# Patient Record
Sex: FEMALE | Race: WHITE | NOT HISPANIC OR LATINO | Employment: FULL TIME | URBAN - METROPOLITAN AREA
[De-identification: names, ages, dates, MRNs, and addresses within clinical notes are randomized per-mention and may not be internally consistent; named-entity substitution may affect disease eponyms.]

---

## 2018-08-04 ENCOUNTER — WALK IN (OUTPATIENT)
Dept: URGENT CARE | Age: 43
End: 2018-08-04

## 2018-08-04 VITALS
BODY MASS INDEX: 22.66 KG/M2 | WEIGHT: 123.13 LBS | SYSTOLIC BLOOD PRESSURE: 118 MMHG | RESPIRATION RATE: 16 BRPM | HEIGHT: 62 IN | TEMPERATURE: 97.9 F | HEART RATE: 80 BPM | OXYGEN SATURATION: 95 % | DIASTOLIC BLOOD PRESSURE: 83 MMHG

## 2018-08-04 DIAGNOSIS — R21 RASH OF HANDS: Primary | ICD-10-CM

## 2018-08-04 PROCEDURE — 99203 OFFICE O/P NEW LOW 30 MIN: CPT | Performed by: PHYSICIAN ASSISTANT

## 2018-08-04 RX ORDER — ATENOLOL 25 MG/1
25 TABLET ORAL DAILY
COMMUNITY

## 2018-08-04 RX ORDER — VENLAFAXINE HYDROCHLORIDE 150 MG/1
150 CAPSULE, EXTENDED RELEASE ORAL DAILY
COMMUNITY

## 2018-08-04 RX ORDER — BUSPIRONE HYDROCHLORIDE 15 MG/1
15 TABLET ORAL 2 TIMES DAILY
COMMUNITY

## 2018-08-04 RX ORDER — TRIAMCINOLONE ACETONIDE 1 MG/G
CREAM TOPICAL 3 TIMES DAILY
Qty: 30 G | Refills: 0 | Status: SHIPPED | OUTPATIENT
Start: 2018-08-04

## 2018-08-04 RX ORDER — SUMATRIPTAN 100 MG/1
100 TABLET, FILM COATED ORAL PRN
COMMUNITY

## 2018-08-04 ASSESSMENT — ENCOUNTER SYMPTOMS
CHILLS: 0
FEVER: 0
WEAKNESS: 0
NUMBNESS: 0

## 2019-02-06 ENCOUNTER — HOSPITAL ENCOUNTER (OUTPATIENT)
Dept: MAMMOGRAPHY | Age: 44
Discharge: HOME | End: 2019-02-06
Payer: COMMERCIAL

## 2019-02-06 DIAGNOSIS — Z12.39 BREAST CANCER SCREENING: ICD-10-CM

## 2019-02-06 PROCEDURE — 77067 SCR MAMMO BI INCL CAD: CPT

## 2020-02-21 ENCOUNTER — HOSPITAL ENCOUNTER (OUTPATIENT)
Dept: MAMMOGRAPHY | Age: 45
Discharge: HOME | End: 2020-02-21
Payer: COMMERCIAL

## 2020-02-21 PROCEDURE — 77063 BREAST TOMOSYNTHESIS BI: CPT

## 2020-09-12 ENCOUNTER — HOSPITAL ENCOUNTER (EMERGENCY)
Age: 45
Discharge: HOME | End: 2020-09-12
Payer: COMMERCIAL

## 2020-09-12 ENCOUNTER — HOSPITAL ENCOUNTER (EMERGENCY)
Age: 45
Discharge: HOME | End: 2020-09-12
Attending: EMERGENCY MEDICINE
Payer: COMMERCIAL

## 2020-09-12 VITALS
TEMPERATURE: 98.7 F | RESPIRATION RATE: 16 BRPM | HEART RATE: 65 BPM | BODY MASS INDEX: 22.63 KG/M2 | DIASTOLIC BLOOD PRESSURE: 86 MMHG | HEIGHT: 62 IN | OXYGEN SATURATION: 100 % | SYSTOLIC BLOOD PRESSURE: 130 MMHG | WEIGHT: 123 LBS

## 2020-09-12 VITALS
DIASTOLIC BLOOD PRESSURE: 81 MMHG | HEIGHT: 62 IN | HEART RATE: 72 BPM | OXYGEN SATURATION: 98 % | WEIGHT: 120 LBS | BODY MASS INDEX: 22.08 KG/M2 | TEMPERATURE: 98 F | RESPIRATION RATE: 20 BRPM | SYSTOLIC BLOOD PRESSURE: 139 MMHG

## 2020-09-12 PROCEDURE — 96375 TX/PRO/DX INJ NEW DRUG ADDON: CPT

## 2020-09-12 PROCEDURE — 99212 OFFICE O/P EST SF 10 MIN: CPT

## 2020-09-12 PROCEDURE — 2500000003 HC RX 250 WO HCPCS: Performed by: PHYSICIAN ASSISTANT

## 2020-09-12 PROCEDURE — 96372 THER/PROPH/DIAG INJ SC/IM: CPT

## 2020-09-12 PROCEDURE — 6360000002 HC RX W HCPCS: Performed by: EMERGENCY MEDICINE

## 2020-09-12 PROCEDURE — 6360000002 HC RX W HCPCS: Performed by: PHYSICIAN ASSISTANT

## 2020-09-12 PROCEDURE — 99282 EMERGENCY DEPT VISIT SF MDM: CPT

## 2020-09-12 PROCEDURE — 99203 OFFICE O/P NEW LOW 30 MIN: CPT | Performed by: EMERGENCY MEDICINE

## 2020-09-12 PROCEDURE — 96374 THER/PROPH/DIAG INJ IV PUSH: CPT

## 2020-09-12 PROCEDURE — 2580000003 HC RX 258: Performed by: PHYSICIAN ASSISTANT

## 2020-09-12 RX ORDER — FAMOTIDINE 20 MG/1
20 TABLET, FILM COATED ORAL 2 TIMES DAILY
Qty: 14 TABLET | Refills: 0 | Status: SHIPPED | OUTPATIENT
Start: 2020-09-12

## 2020-09-12 RX ORDER — METHYLPREDNISOLONE ACETATE 80 MG/ML
80 INJECTION, SUSPENSION INTRA-ARTICULAR; INTRALESIONAL; INTRAMUSCULAR; SOFT TISSUE ONCE
Status: COMPLETED | OUTPATIENT
Start: 2020-09-12 | End: 2020-09-12

## 2020-09-12 RX ORDER — PREDNISONE 20 MG/1
20 TABLET ORAL DAILY
Qty: 14 TABLET | Refills: 0 | Status: SHIPPED | OUTPATIENT
Start: 2020-09-12 | End: 2020-09-24

## 2020-09-12 RX ORDER — 0.9 % SODIUM CHLORIDE 0.9 %
1000 INTRAVENOUS SOLUTION INTRAVENOUS ONCE
Status: COMPLETED | OUTPATIENT
Start: 2020-09-12 | End: 2020-09-12

## 2020-09-12 RX ORDER — HYDROXYZINE 50 MG/1
50 TABLET, FILM COATED ORAL 4 TIMES DAILY PRN
Qty: 20 TABLET | Refills: 0 | Status: SHIPPED | OUTPATIENT
Start: 2020-09-12

## 2020-09-12 RX ORDER — DIPHENHYDRAMINE HYDROCHLORIDE 50 MG/ML
50 INJECTION INTRAMUSCULAR; INTRAVENOUS ONCE
Status: COMPLETED | OUTPATIENT
Start: 2020-09-12 | End: 2020-09-12

## 2020-09-12 RX ADMIN — SODIUM CHLORIDE 1000 ML: 9 INJECTION, SOLUTION INTRAVENOUS at 20:20

## 2020-09-12 RX ADMIN — METHYLPREDNISOLONE ACETATE 80 MG: 80 INJECTION, SUSPENSION INTRA-ARTICULAR; INTRALESIONAL; INTRAMUSCULAR; SOFT TISSUE at 18:19

## 2020-09-12 RX ADMIN — HYDROCORTISONE SODIUM SUCCINATE 100 MG: 100 INJECTION, POWDER, FOR SOLUTION INTRAMUSCULAR; INTRAVENOUS at 20:20

## 2020-09-12 RX ADMIN — DIPHENHYDRAMINE HYDROCHLORIDE 50 MG: 50 INJECTION, SOLUTION INTRAMUSCULAR; INTRAVENOUS at 20:20

## 2020-09-12 RX ADMIN — FAMOTIDINE 20 MG: 10 INJECTION INTRAVENOUS at 20:20

## 2020-09-12 ASSESSMENT — ENCOUNTER SYMPTOMS
FACIAL SWELLING: 1
BACK PAIN: 0
STRIDOR: 0
VOICE CHANGE: 0
CHOKING: 0
SINUS PRESSURE: 0
EYE REDNESS: 0
TROUBLE SWALLOWING: 0
DIARRHEA: 0
NAUSEA: 0
BLOOD IN STOOL: 0
VOMITING: 0
RHINORRHEA: 0
PHOTOPHOBIA: 0
EYE PAIN: 0
SHORTNESS OF BREATH: 0
ROS SKIN COMMENTS: ITCHY RASH TRUNK AND EXTREMITIES
FACIAL SWELLING: 0
NAUSEA: 0
CONSTIPATION: 0
COUGH: 0
WHEEZING: 0
SORE THROAT: 0
EYE DISCHARGE: 0
TROUBLE SWALLOWING: 0
SHORTNESS OF BREATH: 0
DIARRHEA: 0
COUGH: 0
ABDOMINAL PAIN: 0

## 2020-09-12 ASSESSMENT — PAIN SCALES - GENERAL: PAINLEVEL_OUTOF10: 10

## 2020-09-12 NOTE — ED NOTES
Pt arrives with complaints of allergic reaction. Pt seen at urgent care. She was only given prednisone at Urgent care. pt reports itching has gotten worse.      John Garcia RN  09/12/20 5060

## 2020-09-12 NOTE — LETTER
[facility]  [address] [place] [id number]  Phone: [phone]               September 12, 2020    Patient: Alpesh Workman   YOB: 1975   Date of Visit: 9/12/2020       To Whom It May Concern:    Brandon Amin was seen and treated in our emergency department on 9/12/2020. She may return to work on 9/16/2020.   No work September 14 and September 15, 2020      Sincerely,       Renita Quach MD         Signature:__________________________________

## 2020-09-12 NOTE — ED NOTES
Patient presents to [facility] with complaints of rash all over that started x2 hours ago. Patient states she's not doing anything different besides today she was using cleaning products all day due to moving. Patient reports lower lip swelling and no angio edema or airway issues.       Heron Fish RN  09/12/20 8605

## 2020-09-12 NOTE — ED PROVIDER NOTES
[address]     No chief complaint on file. Nurses Notes reviewed and I agree except as noted in the HPI. HISTORY OF PRESENT ILLNESS   Mary Waters is a 50 y.o. female who presents with 2-hour history of increasingly severe itchy rash on the trunk and extremities. 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 No chest pain, shortness of breath, wheezing, stridor, abdominal pain, vomiting, dizziness, syncope. Status post BLTL. No new soaps, cosmetics, medications. No insect bite or sting. No history of diabetes or asthma. Non-smoker  REVIEW OF SYSTEMS     Review of Systems   Constitutional: Negative for appetite change, chills, fatigue, fever and unexpected weight change. HENT: Negative for congestion, ear discharge, ear pain, facial swelling, hearing loss, nosebleeds, postnasal drip, sinus pressure, sore throat, trouble swallowing and voice change. Eyes: Negative for pain, discharge, redness and visual disturbance. Respiratory: Negative for cough, choking, shortness of breath, wheezing and stridor. Cardiovascular: Negative for chest pain and leg swelling. Gastrointestinal: Negative for abdominal pain, blood in stool, constipation, diarrhea, nausea and vomiting. Genitourinary: Negative for dysuria, flank pain, frequency, hematuria, urgency, vaginal bleeding and vaginal discharge. Musculoskeletal: Negative for arthralgias, back pain, neck pain and neck stiffness. Skin: Positive for rash. Itchy rash trunk and extremities   Neurological: Negative for dizziness, seizures, syncope, weakness, light-headedness and headaches. Hematological: Negative for adenopathy. Does not bruise/bleed easily. Psychiatric/Behavioral: Negative for confusion, sleep disturbance and suicidal ideas. The patient is not nervous/anxious. All other systems reviewed and are negative.       PAST MEDICAL HISTORY Diagnosis Date    Headache        SURGICAL HISTORY     Patient  has a past surgical history that includes Hysterectomy; Stomach surgery; Tonsillectomy; Appendectomy; and Colonoscopy (2009). CURRENT MEDICATIONS       Discharge Medication List as of 9/12/2020  6:34 PM      CONTINUE these medications which have NOT CHANGED    Details   SUMAtriptan (IMITREX) 100 MG tablet Take 100 mg by mouth once as needed for Migraine      busPIRone (BUSPAR) 15 MG tablet Take 15 mg by mouth daily      venlafaxine (EFFEXOR XR) 150 MG XR capsule Take 150 mg by mouth daily. ferrous sulfate 300 (60 FE) MG/5ML syrup Take 300 mg by mouth daily. SUPREP BOWEL PREP SOLN Take 1 kit by mouth See Admin Instructions Use as directed, Disp-1 Bottle, R-0, CORNEL      Multiple Vitamins-Minerals (MULTI COMPLETE) CAPS Take  by mouth.      vitamin B-12 (CYANOCOBALAMIN) 100 MCG tablet Take 50 mcg by mouth daily. ALLERGIES     Patient is has No Known Allergies. FAMILY HISTORY     Patient'sfamily history includes Mental Illness in her mother. SOCIAL HISTORY     Patient  reports that she has never smoked. She has never used smokeless tobacco. She reports current alcohol use. She reports that she does not use drugs. PHYSICAL EXAM     ED TRIAGE VITALS  BP: 139/81, Temp: 98 °F (36.7 °C), Pulse: 72, Resp: 20, SpO2: 98 %  Physical Exam  Vitals signs and nursing note reviewed. Constitutional:       General: She is not in acute distress. Appearance: She is well-developed. She is not ill-appearing. Comments: Normal hearing, moist membranes, no stridor   HENT:      Head: Normocephalic and atraumatic. Right Ear: Tympanic membrane and external ear normal.      Left Ear: Tympanic membrane and external ear normal.      Nose: No congestion or rhinorrhea. Right Sinus: No maxillary sinus tenderness or frontal sinus tenderness. Left Sinus: No maxillary sinus tenderness or frontal sinus tenderness. Mouth/Throat:      Pharynx: No oropharyngeal exudate. Comments: Oropharynx normal no angioedema  Eyes:      General: No scleral icterus. Right eye: No discharge. Left eye: No discharge. Extraocular Movements:      Right eye: Normal extraocular motion. Left eye: Normal extraocular motion. Conjunctiva/sclera: Conjunctivae normal.      Pupils: Pupils are equal, round, and reactive to light. Comments: Conjunctiva clear   Neck:      Musculoskeletal: Normal range of motion. Thyroid: No thyromegaly. Vascular: No JVD. Comments: No meningismus  Cardiovascular:      Rate and Rhythm: Normal rate and regular rhythm. Pulses: Normal pulses. Heart sounds: Normal heart sounds, S1 normal and S2 normal. No murmur. No friction rub. No gallop. Pulmonary:      Effort: Pulmonary effort is normal. No respiratory distress. Breath sounds: Normal breath sounds. No stridor. No decreased breath sounds, wheezing, rhonchi or rales. Comments: No cough, lungs clear  Chest:      Chest wall: No tenderness. Abdominal:      General: Bowel sounds are normal. There is no distension. Palpations: Abdomen is soft. There is no mass. Tenderness: There is no abdominal tenderness. There is no right CVA tenderness, left CVA tenderness, guarding or rebound. Comments: Soft nontender   Musculoskeletal: Normal range of motion. General: No tenderness. Right lower leg: Normal.      Left lower leg: Normal.   Lymphadenopathy:      Cervical: Cervical adenopathy present. Right cervical: Superficial cervical adenopathy present. No deep cervical adenopathy. Left cervical: Superficial cervical adenopathy present. No deep cervical adenopathy. Skin:     General: Skin is warm and dry. Findings: No erythema or rash. Comments: Urticarial rash trunk and extremities. No vesicular eruptions. No angioedema. No infestation. No bacterial infection. Neurological:      Mental Status: She is alert and oriented to person, place, and time. Cranial Nerves: No cranial nerve deficit. Motor: No abnormal muscle tone. Coordination: Coordination normal.      Deep Tendon Reflexes: Reflexes are normal and symmetric. Reflexes normal.   Psychiatric:         Behavior: Behavior normal.         Thought Content: Thought content normal.         Judgment: Judgment normal.         DIAGNOSTIC RESULTS   Labs: No results found for this visit on 09/12/20. IMAGING:  No orders to display     URGENT CARE COURSE:     Vitals:    09/12/20 1813 09/12/20 1839   BP: 139/81    Pulse: 80 72   Resp: 20 20   Temp: 98 °F (36.7 °C)    TempSrc: Tympanic    SpO2: 99% 98%   Weight: 120 lb (54.4 kg)    Height: 5' 2\" (1.575 m)        Medications   methylPREDNISolone acetate (DEPO-MEDROL) injection 80 mg (80 mg Intramuscular Given 9/12/20 1819)   Tolerated well  PROCEDURES:  None  FINALIMPRESSION      1. Acute allergic reaction, initial encounter    2. Acute urticaria        DISPOSITION/PLAN   DISPOSITION    Nontoxic, well-hydrated, normal airway. No anaphylactic or anaphylactoid reaction, SJS, TEN. No infectious process. No airway compromise or bronchospasm. Patient has acute urticaria likely from cleaning agents. Patient received Depo-Medrol IM. Will require systemic corticosteroids and antihistamines. Will treat with prednisone, Atarax, rest in cool air conditioned space, increased oral clear liquids. Patient to recheck with PCP in 3 days if problems persist, and she understands to go to ED if worse.   PATIENT REFERRED TO:  Damari Liu, [address] [place]  [phone]    Schedule an appointment as soon as possible for a visit in 3 days  Recheck if problems persist, go to emergency if worse    DISCHARGE MEDICATIONS:  Discharge Medication List as of 9/12/2020  6:34 PM      START taking these medications    Details   predniSONE (DELTASONE) 20 MG tablet Take 1 tablet by mouth daily for 12 days 2 p.o. daily for 4 days, 1 p.o. daily for 4 days, one half p.o. daily for 4 days, Disp-14 tablet,R-0Print      hydrOXYzine (ATARAX) 50 MG tablet Take 1 tablet by mouth 4 times daily as needed for Itching (Itchy rash swelling) Caution will cause drowsiness, not at work, Disp-20 tablet,R-0Print           Discharge Medication List as of 9/12/2020  6:34 PM          MD Bebe Graves MD  09/12/20 9310

## 2020-09-12 NOTE — ED NOTES
Pt. Released in stable condition, ambulated per self to private car. Instructed pt to follow-up with family doctor as needed for recheck or go directly to the emergency department for any concerns/worsening conditions. Pt. Verbalized understanding of instructions. No questions at this time. RX in hand.       Kristen Batista RN  09/12/20 7410

## 2020-09-13 NOTE — ED PROVIDER NOTES
[facility] EMERGENCY DEPT      CHIEF COMPLAINT       Chief Complaint   Patient presents with    Allergic Reaction       Nurses Notes reviewed and I agree except as noted in the HPI. HISTORY OF PRESENT ILLNESS    Karen Patel is a 50 y.o. female who presents for an allergic reaction. The cause of the reaction is unknown. Patient states she was using a  that she has used in the past, and afterwards, she noticed itchiness under her breasts and on her right flank. She then took a shower, and the urticaria continued to spread to her back, arms, groin area, and her face. She denies any shortness of breath, throat tightness, tongue swelling, or airway obstruction. She denies any other allergies. Denies any new medications or detergent use. Patient was seen in Urgent  Care and given 80 mg IM methylprednisolone. The reaction has continued to worsen, so she came to the ED for further evaluation. REVIEW OF SYSTEMS     Review of Systems   Constitutional: Negative for chills and fever. HENT: Positive for facial swelling. Negative for ear pain, rhinorrhea and trouble swallowing. Eyes: Negative for photophobia. Respiratory: Negative for cough and shortness of breath. Cardiovascular: Negative for chest pain and palpitations. Gastrointestinal: Negative for diarrhea and nausea. Musculoskeletal: Negative for myalgias. Skin: Positive for rash. Allergic/Immunologic: Negative for environmental allergies and food allergies. Neurological: Negative for dizziness, light-headedness and numbness. Psychiatric/Behavioral: Negative for confusion. PAST MEDICAL HISTORY    has a past medical history of Headache. SURGICAL HISTORY      has a past surgical history that includes Hysterectomy; Stomach surgery; Tonsillectomy; Appendectomy; and Colonoscopy (2009).     CURRENT MEDICATIONS       Previous Medications    BUSPIRONE (BUSPAR) 15 MG TABLET    Take 15 mg by mouth daily    FERROUS SULFATE 300 (60 FE) MG/5ML SYRUP    Take 300 mg by mouth daily. HYDROXYZINE (ATARAX) 50 MG TABLET    Take 1 tablet by mouth 4 times daily as needed for Itching (Itchy rash swelling) Caution will cause drowsiness, not at work    MULTIPLE VITAMINS-MINERALS (MULTI COMPLETE) CAPS    Take  by mouth. PREDNISONE (DELTASONE) 20 MG TABLET    Take 1 tablet by mouth daily for 12 days 2 p.o. daily for 4 days, 1 p.o. daily for 4 days, one half p.o. daily for 4 days    SUMATRIPTAN (IMITREX) 100 MG TABLET    Take 100 mg by mouth once as needed for Migraine    SUPREP BOWEL PREP SOLN    Take 1 kit by mouth See Admin Instructions Use as directed    VENLAFAXINE (EFFEXOR XR) 150 MG XR CAPSULE    Take 150 mg by mouth daily. VITAMIN B-12 (CYANOCOBALAMIN) 100 MCG TABLET    Take 50 mcg by mouth daily. ALLERGIES     has No Known Allergies. FAMILY HISTORY     She indicated that her mother is alive. She indicated that her father is alive. family history includes Mental Illness in her mother. SOCIAL HISTORY    reports that she has never smoked. She has never used smokeless tobacco. She reports current alcohol use. She reports that she does not use drugs. PHYSICAL EXAM     INITIAL VITALS:  height is 5' 2\" (1.575 m) and weight is 123 lb (55.8 kg). Her temperature is 98.7 °F (37.1 °C). Her blood pressure is 130/86 and her pulse is 65. Her respiration is 16 and oxygen saturation is 100%. Physical Exam  Vitals signs and nursing note reviewed. Constitutional:       General: She is not in acute distress. Appearance: Normal appearance. She is well-developed and normal weight. She is not toxic-appearing or diaphoretic. HENT:      Head: Normocephalic and atraumatic. Jaw: No trismus. Right Ear: Tympanic membrane and ear canal normal. No drainage. Left Ear: Tympanic membrane and ear canal normal. No drainage. Nose: Nose normal. No rhinorrhea.       Mouth/Throat:      Mouth: Mucous membranes are moist.      Pharynx: Uvula midline. No oropharyngeal exudate or posterior oropharyngeal erythema. Tonsils: No tonsillar abscesses. Comments: No acute swelling of the tongue or oropharynx  Eyes:      General: Lids are normal.         Right eye: No discharge. Left eye: No discharge. Conjunctiva/sclera: Conjunctivae normal.   Neck:      Musculoskeletal: Normal range of motion. No neck rigidity. Trachea: Trachea normal.   Cardiovascular:      Rate and Rhythm: Normal rate and regular rhythm. Pulses: Normal pulses. Heart sounds: Normal heart sounds. Pulmonary:      Effort: Pulmonary effort is normal. No respiratory distress. Breath sounds: Normal breath sounds. No decreased breath sounds, wheezing, rhonchi or rales. Abdominal:      General: Abdomen is flat. There is no distension. Palpations: Abdomen is soft. Abdomen is not rigid. Tenderness: There is no abdominal tenderness. Musculoskeletal:      Comments: Well perfused; no signs of DVT; movement normal as observed. Lymphadenopathy:      Head:      Right side of head: No submental, submandibular, preauricular or posterior auricular adenopathy. Left side of head: No submental, submandibular, preauricular or posterior auricular adenopathy. Cervical: No cervical adenopathy. Skin:     General: Skin is warm and dry. Coloration: Skin is not pale. Findings: Rash present. Rash is urticarial.          Neurological:      Mental Status: She is alert and oriented to person, place, and time. GCS: GCS eye subscore is 4. GCS verbal subscore is 5. GCS motor subscore is 6. Psychiatric:         Speech: Speech normal.         Behavior: Behavior normal. Behavior is cooperative. Thought Content:  Thought content normal.         DIFFERENTIAL DIAGNOSIS:   Including but not limited to: acute allergic reaction, urticaria, angioedema    DIAGNOSTIC RESULTS     EKG: All EKG's are interpreted by theEmergency Department Physician who either signs or Co-signs this chart in the absence of a cardiologist.  None    RADIOLOGY: non-plain film images(s) such as CT,Ultrasound and MRI are read by the radiologist.  Plain radiographic images are visualized and preliminarily interpreted by the emergency physician unless otherwise stated below. No orders to display       LABS:   Labs Reviewed - No data to display    EMERGENCY DEPARTMENT COURSE:   Vitals:    Vitals:    09/12/20 1929   BP: 130/86   Pulse: 65   Resp: 16   Temp: 98.7 °F (37.1 °C)   SpO2: 100%   Weight: 123 lb (55.8 kg)   Height: 5' 2\" (1.575 m)     2110: Reassessed patient. Symptoms resolved    MDM:  Patient was seen by me initially in the intake area for urticaria. Physical exam revealed diffuse erythema and urticaria to the arms and trunk. Angioedema was noted to the lips but airway was patent. Vital signs reviewed and noted stable. Old records were reviewed. The patient was moved to room 30 where an IV was established. The patient was then medicated with Solu-Cortef, Benadryl, Pepcid, and normal saline. The patient was observed. She remained stable. On reexam symptoms were resolved. Airway remained patent. Patient was comfortable with plan of discharge home. Anticipatory guidance given. I have given the patient strict written and verbal instructions about care at home, follow-up, and signs and symptoms of worsening of condition and they did verbalize understanding. CRITICAL CARE:   None    CONSULTS:  None    PROCEDURES:  None    FINAL IMPRESSION      1. Urticaria          DISPOSITION/PLAN     1.  Urticaria        PATIENT REFERRED TO:  Steve Sotelo, [address] [facility]  [phone]    Schedule an appointment as soon as possible for a visit in 2 days        DISCHARGE MEDICATIONS:  New Prescriptions    FAMOTIDINE (PEPCID) 20 MG TABLET    Take 1 tablet by mouth 2 times daily       (Please note that portions of this note were completed with a voice recognition program.  Efforts were made to edit the dictations but occasionally words are mis-transcribed.)    Aki Roberson PA-C 09/12/20 9:16 PM    ABBEY Schofield PA-C  09/12/20 2120

## 2020-09-13 NOTE — ED NOTES
Pt states that she does not itch any more and hives are gone.       Marvina Ganser, RN  09/12/20 2244

## 2021-02-12 ENCOUNTER — HOSPITAL ENCOUNTER (EMERGENCY)
Age: 46
Discharge: HOME | End: 2021-02-13
Attending: EMERGENCY MEDICINE

## 2021-02-12 DIAGNOSIS — S62.624A CLOSED DISPLACED FRACTURE OF MIDDLE PHALANX OF RIGHT RING FINGER, INITIAL ENCOUNTER: Primary | ICD-10-CM

## 2021-02-12 PROCEDURE — 99283 EMERGENCY DEPT VISIT LOW MDM: CPT

## 2021-02-13 ENCOUNTER — APPOINTMENT (OUTPATIENT)
Dept: GENERAL RADIOLOGY | Age: 46
End: 2021-02-13

## 2021-02-13 VITALS
SYSTOLIC BLOOD PRESSURE: 134 MMHG | OXYGEN SATURATION: 100 % | RESPIRATION RATE: 20 BRPM | HEART RATE: 73 BPM | TEMPERATURE: 97.7 F | DIASTOLIC BLOOD PRESSURE: 77 MMHG

## 2021-02-13 PROCEDURE — 6370000000 HC RX 637 (ALT 250 FOR IP): Performed by: EMERGENCY MEDICINE

## 2021-02-13 PROCEDURE — 26755 TREAT FINGER FRACTURE EACH: CPT

## 2021-02-13 PROCEDURE — 73140 X-RAY EXAM OF FINGER(S): CPT

## 2021-02-13 RX ORDER — IBUPROFEN 200 MG
600 TABLET ORAL ONCE
Status: COMPLETED | OUTPATIENT
Start: 2021-02-13 | End: 2021-02-13

## 2021-02-13 RX ADMIN — IBUPROFEN 600 MG: 200 TABLET, FILM COATED ORAL at 00:28

## 2021-02-13 ASSESSMENT — PAIN SCALES - GENERAL: PAINLEVEL_OUTOF10: 7

## 2021-02-13 NOTE — ED NOTES
Pt presents to the ed with right pinky injury. PT states that it was jammed when she was getting lifted into a truck.       [place], [place]  45/21/45 0347

## 2021-02-13 NOTE — ED PROVIDER NOTES
[address] [address]          Pt Name: Pema Keith  MRN: [id number]  [place] 1975  Date of evaluation: 2/12/2021  Treating Resident Physician: Kojo Braov MD  Supervising Physician: Constance Wilkins MD    CHIEF COMPLAINT       Chief Complaint   Patient presents with    Finger Injury     History obtained from the patient. HISTORY OF PRESENT ILLNESS    Pema Keith is a 50 y.o. female who presents to the emergency department for evaluation of injury to her finger. Patient states about an hour ago she jammed her right pinky finger - mechanism of injury unclear. States the finger is painful to touch or move and is swollen. The patient has no other acute complaints at this time. REVIEW OF SYSTEMS   Review of Systems   Constitutional: Negative for fever. Musculoskeletal: Positive for joint swelling. Skin: Negative for rash and wound. Psychiatric/Behavioral: Negative for confusion. PAST MEDICAL AND SURGICAL HISTORY     Past Medical History:   Diagnosis Date    Headache      Past Surgical History:   Procedure Laterality Date    APPENDECTOMY      COLONOSCOPY  2009    HYSTERECTOMY      STOMACH SURGERY      TONSILLECTOMY           MEDICATIONS   No current facility-administered medications for this encounter.      Current Outpatient Medications:     hydrOXYzine (ATARAX) 50 MG tablet, Take 1 tablet by mouth 4 times daily as needed for Itching (Itchy rash swelling) Caution will cause drowsiness, not at work, Disp: 20 tablet, Rfl: 0    famotidine (PEPCID) 20 MG tablet, Take 1 tablet by mouth 2 times daily, Disp: 14 tablet, Rfl: 0    SUMAtriptan (IMITREX) 100 MG tablet, Take 100 mg by mouth once as needed for Migraine, Disp: , Rfl:     busPIRone (BUSPAR) 15 MG tablet, Take 15 mg by mouth daily, Disp: , Rfl:     SUPREP BOWEL PREP SOLN, Take 1 kit by mouth See Admin Instructions Use as directed, Disp: 1 Bottle, Rfl: 0   venlafaxine (EFFEXOR XR) 150 MG XR capsule, Take 150 mg by mouth daily. , Disp: , Rfl:     ferrous sulfate 300 (60 FE) MG/5ML syrup, Take 300 mg by mouth daily. , Disp: , Rfl:     Multiple Vitamins-Minerals (MULTI COMPLETE) CAPS, Take  by mouth., Disp: , Rfl:     vitamin B-12 (CYANOCOBALAMIN) 100 MCG tablet, Take 50 mcg by mouth daily. , Disp: , Rfl:       SOCIAL HISTORY     Social History     Social History Narrative    Not on file     Social History     Tobacco Use    Smoking status: Never Smoker    Smokeless tobacco: Never Used   Substance Use Topics    Alcohol use: Yes    Drug use: No         ALLERGIES   No Known Allergies      FAMILY HISTORY     Family History   Problem Relation Age of Onset    Mental Illness Mother          PREVIOUS RECORDS   Previous records reviewed: Reviewed. PHYSICAL EXAM     ED Triage Vitals [02/13/21 0003]   BP Temp Temp Source Pulse Resp SpO2 Height Weight   134/77 97.7 °F (36.5 °C) Oral 73 20 100 % -- --     Initial vital signs and nursing assessment reviewed and normal. There is no height or weight on file to calculate BMI. Pulsoximetry is normal per my interpretation. Additional Vital Signs:  Vitals:    02/13/21 0003   BP: 134/77   Pulse: 73   Resp: 20   Temp: 97.7 °F (36.5 °C)   SpO2: 100%         Physical Exam  Constitutional:       Appearance: Normal appearance. HENT:      Head: Normocephalic and atraumatic. Right Ear: External ear normal.      Left Ear: External ear normal.   Eyes:      Conjunctiva/sclera: Conjunctivae normal.   Cardiovascular:      Rate and Rhythm: Normal rate. Pulses: Normal pulses. Pulmonary:      Effort: Pulmonary effort is normal.   Musculoskeletal:         General: Tenderness and signs of injury present. Comments: Right pinky finger with swelling noted. Tender to touch. Not able to flex, extension limited   Skin:     General: Skin is warm and dry.            MEDICAL DECISION MAKING Initial Assessment: 50year old female presents with injury to her right pinky finger. Unclear mechanism of injury. Pinky swollen, tender to touch and limited ROM. Plan:   - X-ray right finger        ED RESULTS   Laboratory results:  Labs Reviewed - No data to display    Radiologic studies results:  XR FINGER RIGHT (MIN 2 VIEWS)   Final Result   Fifth middle phalanx fracture. This document has been electronically signed by: Deniz Antonio MD on    02/13/2021 12:52 AM          ED Medications administered this visit:   Medications   ibuprofen (ADVIL;MOTRIN) tablet 600 mg (600 mg Oral Given 2/13/21 0028)         ED COURSE      X-ray with displaced, overriding fracture through the distal aspect of the fifth middle phalanx. Ortho Injury    Date/Time: 2/13/2021 1:09 AM  Performed by: Yanet Fischer MD  Authorized by: Yanet Fischer MD   Consent: Verbal consent obtained.   Consent given by: patient  Patient understanding: patient states understanding of the procedure being performed  Patient consent: the patient's understanding of the procedure matches consent given  Procedure consent: procedure consent matches procedure scheduled  Relevant documents: relevant documents present and verified  Test results: test results available and properly labeled  Site marked: the operative site was marked  Imaging studies: imaging studies available  Patient identity confirmed: verbally with patient and arm band  Injury location: finger  Location details: right little finger  Injury type: fracture-dislocation  Fracture type: middle phalanx  MCP joint involved: no  IP joint involved: no  Manipulation performed: yes  Reduction successful: yes  Immobilization: splint  Supplies used: aluminum splint  Patient tolerance: patient tolerated the procedure well with no immediate complications        Patient to follow-up with [facility], patient scheduled 2/16/2021 at 12:10pm Strict return precautions and follow up instructions were discussed with the patient prior to discharge, with which the patient agrees. MEDICATION CHANGES     Current Discharge Medication List            FINAL DISPOSITION     Final diagnoses:   Closed displaced fracture of middle phalanx of right ring finger, initial encounter     Condition: condition: good  Dispo: Discharge to home      This transcription was electronically signed. Parts of this transcriptions may have been dictated by use of voice recognition software and electronically transcribed, and parts may have been transcribed with the assistance of an ED scribe. The transcription may contain errors not detected in proofreading. Please refer to my supervising physician's documentation if my documentation differs.     Electronically Signed: Ellen Romero, 02/13/21, 1:08 Bernetta Simmonds, MD  Resident  02/13/21 0111